# Patient Record
(demographics unavailable — no encounter records)

---
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
## 2024-11-01 NOTE — HISTORY OF PRESENT ILLNESS
[None] : no symptoms [5] : 5 [FreeTextEntry1] : 33F with history of IC and pelvic pain. Reports pelvic pain around midway through menstrual cycle that dissipates with start of her menstrual period.  Dyspareunia, but not sexually active at this time Has utilized vaginal valium in the past with success.  Has had many years of pelvic floor therapy.  Episodes of urine retention. Reports urinary hesitancy. Hx of urinary retention. Interstim by [employer] placed in 2021 by urologist in [place].  And DRG. Reports IC symptoms are well controlled. Not following IC diet. Hx of hydrodistension.  Endorses constipation.  Currently under care of pain management specialist.  Dr. Meagan Morel, [phone] History of constipation  [Urinary Incontinence] : no urinary incontinence [Urinary Retention] : no urinary retention [Urinary Urgency] : no urinary urgency [Urinary Frequency] : no urinary frequency [de-identified] : Pelvic pain

## 2024-11-01 NOTE — PHYSICAL EXAM
[Normal Appearance] : normal appearance [Well Groomed] : well groomed [General Appearance - In No Acute Distress] : no acute distress [Edema] : no peripheral edema [Respiration, Rhythm And Depth] : normal respiratory rhythm and effort [Exaggerated Use Of Accessory Muscles For Inspiration] : no accessory muscle use [Abdomen Soft] : soft [Abdomen Tenderness] : non-tender [Costovertebral Angle Tenderness] : no ~M costovertebral angle tenderness [Normal Station and Gait] : the gait and station were normal for the patient's age [] : no rash [No Focal Deficits] : no focal deficits [Oriented To Time, Place, And Person] : oriented to person, place, and time [Affect] : the affect was normal [Mood] : the mood was normal [No Palpable Adenopathy] : no palpable adenopathy [Chaperone Present] : A chaperone was present in the examining room during all aspects of the physical examination [28417] : A chaperone was present during the pelvic exam. [Urethral Meatus] : the meatus of the urethra showed no abnormalities [de-identified] : hypertonus of L iliococcygeus muscle. tenderness of R and L obturator muscle. 3/5 contraction with no queuing required. some relaxation with pelvic floor stretching. after perineal stretch, no pain on left, some on right.  [FreeTextEntry2] : Flaca Ovalles NP

## 2025-04-28 NOTE — PHYSICAL EXAM
[Labia Majora] : normal [Normal] : normal [FreeTextEntry2] : Sharron [FreeTextEntry7] : Lsc incisions. Pfannenstiel scar - low. TTP at midpoint of scar. [FreeTextEntry4] : Slight TTP [FreeTextEntry6] : Ut ~ 7 wks, mobile, no adnexal masses.

## 2025-04-28 NOTE — DISCUSSION/SUMMARY
[FreeTextEntry1] : 32 yo P1 w/cyclical pelvic pain of unclear etiology. Endometriosis high on ddx but no obvious findings at time of lsc. Pt recently started on cOCP (to be taken continuously). We spoke at length about the possibility that her pain is not endo. Explained that we should give cOCP 3 months before deciding that it did not work. Spoke about other options for suppression. Pt not TTC anytime soon. Most concerned about effects on her QOL and ability to work.  Plan Pt encouraged to continue Blisovi w/o placebo. TEB x 4 wks for update.  Note to Dr. Greene.

## 2025-05-29 NOTE — DISCUSSION/SUMMARY
[FreeTextEntry1] : 34 yo P1 here for f/u of medical management of endo. Pt on Blisovi - now using w/o placebo. Missed menses 2 wks ago. Pain is still an issue. Has noticed weekly episode of waking in the middle of the night w/pain. Uses buprinorphine then falls back to sleep. No change in location or intensity of pain. Generally not fully pleased w/her progress. Would like to try Orilissa. R/b/a discussed at length. Questions answered.  Plan Send script for Orilissa. Continue Blisovi for short overlap period TEB 3 wks after starting Orilissa

## 2025-05-29 NOTE — HISTORY OF PRESENT ILLNESS
[Home] : at home, [unfilled] , at the time of the visit. [Medical Office: ([facility])___] : at the medical office located in  [Telehealth (audio & video)] : This visit was provided via telehealth using real-time 2-way audio visual technology. [FreeTextEntry1] : -  32 yo P1 here for f/u of medical management of endo. Pt on Blisovi - now using w/o placebo. Missed menses 2 wks ago. Pain is still an issue. Has noticed weekly episode of waking in the middle of the night w/pain. Uses buprinorphine then falls back to sleep. No change in location or intensity of pain. No pain w/BM or urination.    Recap 32 yo P1 w/cyclical pelvic pain of unclear etiology. Endometriosis high on ddx but no obvious findings at time of lsc. Pt recently started on cOCP (to be taken continuously). We spoke at length about the possibility that her pain is not endo. Explained that we should give cOCP 3 months before deciding that it did not work. Spoke about other options for suppression. Pt not TTC anytime soon. Most concerned about effects on her QOL and ability to work.  Plan Pt encouraged to continue Blisovi w/o placebo. TEB x 4 wks for update.  Note to Dr. Greene.

## 2025-07-09 NOTE — HISTORY OF PRESENT ILLNESS
[Home] : at home, [unfilled] , at the time of the visit. [Medical Office: ([facility])___] : at the medical office located in  [Telehealth (audio & video)] : This visit was provided via telehealth using real-time 2-way audio visual technology. [FreeTextEntry1] : 32 yo P1 here for f/u.  Pt was on Orilissa 150 mg daily for 3 wks, good effects on bloating and pain. Noticed increase in anxiety and sleep disturbance -trouble falling asleep secondary to anxiety and panic attacks. Woke every 1-2 hrs. Spoke to her psychiatrist. Stopped Orilissa Thursday. Since then, she has presented to the [place] ED for pain. Had USG showing anteverted ut and small ovaries. Possible adeno.  Pt states that she has a history of post partum anxiety.  Considering the option for future fertility but does not want to live with pain. Has a child w/special needs. Would like to maintain possibility for future pregnancy, but not at the expense of her QOL.   Recap 32 yo P1 here for f/u of medical management of endo. Pt on Blisovi - now using w/o placebo. Missed menses 2 wks ago. Pain is still an issue. Has noticed weekly episode of waking in the middle of the night w/pain. Uses buprinorphine then falls back to sleep. No change in location or intensity of pain. Generally not fully pleased w/her progress. Would like to try Orilissa. R/b/a discussed at length. Questions answered.  Plan Send script for Orilissa. Continue Blisovi for short overlap period TEB 3 wks after starting Orilissa.

## 2025-07-09 NOTE — DISCUSSION/SUMMARY
[FreeTextEntry1] : 32 yo P1 w/CPP. Unable to amy Orilissa secondary to anxiety and sleep disturbance. Possible adeno on MRI.  Plan MRI Script for norethindrone acetate 5 mg daily x 2 wks then increase to 7.5 mg x 2 wks. Goal 15 mg daily. TEB for f/u 3-4 wks

## 2025-07-17 NOTE — DISCUSSION/SUMMARY
[FreeTextEntry1] : 34 yo P1 here for f/u.. States that she has not been well. Has been in the [place] ED 3 times since last visit. In the ED pt received morphine w/toradol and a muscle relaxer. Second visit she was given dilaudid. Now on norethindrone acetate 5 mg x 2wks. No sleep disturbances or increased anxiety. Pt states that it is not working. Pt concerned about her QOL. She states that she "cannot keep living like this." We spoke at length about adenomyosis and endometriosis. Spent time explaining that adenomyosis is suggested by her symptoms and USG findings. MRI not an option. True diagnosis is based on pathology. Would not recommend myometrial sampling. Hysterectomy will treat adeno, but her symptoms may be more complex in that they may be attributed to endo or another etiology. Spoke about possibility that she may still have pain after TLH. Multiple questions answered. Pt would like to proceed w/TLH and dx lsc. Pt off from work Aug 15 - 29. Next option will be Dec.   Plan Schedule TLH (hope for cancellation on schedule to bump this up) Increase norethindrone to 10 mg daily. No need for EMB

## 2025-07-17 NOTE — HISTORY OF PRESENT ILLNESS
[FreeTextEntry1] : 32 yo P1 here for f/u.. States that she has not been well. Has been in the [place] ED 3 times since last visit. In the ED pt received morphine w/toradol and a muscle relaxer. Second visit she was given dilaudid. Now on norethindrone acetate 5 mg x 2wks. No sleep disturbances or increased anxiety. Pt states that it is not working.  Unable to have MRI secondary to devices in her back - dorsal route ganglion stim and Interstim (neither is working). Pt has had   Recap 7/9/25 32 yo P1 w/CPP. Unable to amy Orilissa secondary to anxiety and sleep disturbance. Possible adeno on MRI.  Plan MRI Script for norethindrone acetate 5 mg daily x 2 wks then increase to 7.5 mg x 2 wks. Goal 15 mg daily. TEB for f/u 3-4 wks.